# Patient Record
Sex: MALE | Race: WHITE | NOT HISPANIC OR LATINO | Employment: UNEMPLOYED | URBAN - METROPOLITAN AREA
[De-identification: names, ages, dates, MRNs, and addresses within clinical notes are randomized per-mention and may not be internally consistent; named-entity substitution may affect disease eponyms.]

---

## 2021-07-14 PROBLEM — K62.5 RECTAL BLEEDING: Status: ACTIVE | Noted: 2021-07-14

## 2021-12-10 ENCOUNTER — HOSPITAL ENCOUNTER (OUTPATIENT)
Dept: MEDSURG UNIT | Facility: HOSPITAL | Age: 51
End: 2021-12-11
Attending: STUDENT IN AN ORGANIZED HEALTH CARE EDUCATION/TRAINING PROGRAM | Admitting: PHYSICIAN ASSISTANT

## 2021-12-10 LAB
ABS NEUT (OLG): 4.66 X10(3)/MCL (ref 2.1–9.2)
ALBUMIN SERPL-MCNC: 3.4 GM/DL (ref 3.5–5)
ALBUMIN/GLOB SERPL: 0.9 RATIO (ref 1.1–2)
ALP SERPL-CCNC: 83 UNIT/L (ref 40–150)
ALT SERPL-CCNC: 21 UNIT/L (ref 0–55)
APPEARANCE, UA: CLEAR
AST SERPL-CCNC: 17 UNIT/L (ref 5–34)
BACTERIA #/AREA URNS AUTO: ABNORMAL /HPF
BASOPHILS # BLD AUTO: 0 X10(3)/MCL (ref 0–0.2)
BASOPHILS NFR BLD AUTO: 1 %
BILIRUB SERPL-MCNC: 0.3 MG/DL
BILIRUB UR QL STRIP: NEGATIVE
BILIRUBIN DIRECT+TOT PNL SERPL-MCNC: 0.1 MG/DL (ref 0–0.5)
BILIRUBIN DIRECT+TOT PNL SERPL-MCNC: 0.2 MG/DL (ref 0–0.8)
BNP BLD-MCNC: 12 PG/ML
BUN SERPL-MCNC: 12.3 MG/DL (ref 8.4–25.7)
CALCIUM SERPL-MCNC: 8.8 MG/DL (ref 8.7–10.5)
CHLORIDE SERPL-SCNC: 108 MMOL/L (ref 98–107)
CHOLEST SERPL-MCNC: 147 MG/DL
CHOLEST/HDLC SERPL: 7 {RATIO} (ref 0–5)
CK MB SERPL-MCNC: 0.8 NG/ML
CK SERPL-CCNC: 53 U/L (ref 30–200)
CO2 SERPL-SCNC: 25 MMOL/L (ref 22–29)
COLOR UR: YELLOW
CREAT SERPL-MCNC: 0.82 MG/DL (ref 0.73–1.18)
D DIMER PPP IA.FEU-MCNC: <0.27 MCG/ML FEU
EOSINOPHIL # BLD AUTO: 0.2 X10(3)/MCL (ref 0–0.9)
EOSINOPHIL NFR BLD AUTO: 2 %
ERYTHROCYTE [DISTWIDTH] IN BLOOD BY AUTOMATED COUNT: 13.3 % (ref 11.5–14.5)
EST. AVERAGE GLUCOSE BLD GHB EST-MCNC: 96.8 MG/DL
GLOBULIN SER-MCNC: 3.8 GM/DL (ref 2.4–3.5)
GLUCOSE (UA): NEGATIVE
GLUCOSE SERPL-MCNC: 87 MG/DL (ref 74–100)
HBA1C MFR BLD: 5 %
HCT VFR BLD AUTO: 40.5 % (ref 40–51)
HDLC SERPL-MCNC: 22 MG/DL (ref 35–60)
HGB BLD-MCNC: 12.9 GM/DL (ref 13.5–17.5)
HGB UR QL STRIP: NEGATIVE
HYALINE CASTS #/AREA URNS LPF: ABNORMAL /LPF
IMM GRANULOCYTES # BLD AUTO: 0.03 10*3/UL
IMM GRANULOCYTES NFR BLD AUTO: 0 %
KETONES UR QL STRIP: NEGATIVE
LDLC SERPL CALC-MCNC: 97 MG/DL (ref 50–140)
LEUKOCYTE ESTERASE UR QL STRIP: NEGATIVE
LYMPHOCYTES # BLD AUTO: 2.7 X10(3)/MCL (ref 0.6–4.6)
LYMPHOCYTES NFR BLD AUTO: 33 %
MCH RBC QN AUTO: 26.9 PG (ref 26–34)
MCHC RBC AUTO-ENTMCNC: 31.9 GM/DL (ref 31–37)
MCV RBC AUTO: 84.4 FL (ref 80–100)
MONOCYTES # BLD AUTO: 0.5 X10(3)/MCL (ref 0.1–1.3)
MONOCYTES NFR BLD AUTO: 6 %
NEUTROPHILS # BLD AUTO: 4.66 X10(3)/MCL (ref 2.1–9.2)
NEUTROPHILS NFR BLD AUTO: 58 %
NITRITE UR QL STRIP: NEGATIVE
NRBC BLD AUTO-RTO: 0 % (ref 0–0.2)
PH UR STRIP: 5.5 [PH] (ref 4.5–8)
PLATELET # BLD AUTO: 431 X10(3)/MCL (ref 130–400)
PMV BLD AUTO: 9.4 FL (ref 7.4–10.4)
POTASSIUM SERPL-SCNC: 3.8 MMOL/L (ref 3.5–5.1)
PROT SERPL-MCNC: 7.2 GM/DL (ref 6.4–8.3)
PROT UR QL STRIP: NEGATIVE
RBC # BLD AUTO: 4.8 X10(6)/MCL (ref 4.5–5.9)
RBC #/AREA URNS AUTO: ABNORMAL /HPF
SARS-COV-2 AG RESP QL IA.RAPID: NEGATIVE
SODIUM SERPL-SCNC: 139 MMOL/L (ref 136–145)
SP GR UR STRIP: 1.02 (ref 1–1.03)
SQUAMOUS #/AREA URNS LPF: ABNORMAL /LPF
TRIGL SERPL-MCNC: 139 MG/DL (ref 34–140)
TROPONIN I SERPL-MCNC: 0.11 NG/ML (ref 0–0.04)
TROPONIN I SERPL-MCNC: <0.01 NG/ML (ref 0–0.04)
TSH SERPL-ACNC: 1.83 UIU/ML (ref 0.35–4.94)
URATE SERPL-MCNC: 7.1 MG/DL (ref 3.5–7.2)
UROBILINOGEN UR STRIP-ACNC: NORMAL
VLDLC SERPL CALC-MCNC: 28 MG/DL
WBC # SPEC AUTO: 8.1 X10(3)/MCL (ref 4.5–11)
WBC #/AREA URNS AUTO: ABNORMAL /HPF

## 2021-12-11 LAB
ABS NEUT (OLG): 4.56 X10(3)/MCL (ref 2.1–9.2)
ALBUMIN SERPL-MCNC: 3 GM/DL (ref 3.5–5)
ALBUMIN/GLOB SERPL: 0.8 RATIO (ref 1.1–2)
ALP SERPL-CCNC: 75 UNIT/L (ref 40–150)
ALT SERPL-CCNC: 18 UNIT/L (ref 0–55)
AST SERPL-CCNC: 16 UNIT/L (ref 5–34)
BASOPHILS # BLD AUTO: 0 X10(3)/MCL (ref 0–0.2)
BASOPHILS NFR BLD AUTO: 1 %
BILIRUB SERPL-MCNC: 0.2 MG/DL
BILIRUBIN DIRECT+TOT PNL SERPL-MCNC: 0.1 MG/DL (ref 0–0.5)
BILIRUBIN DIRECT+TOT PNL SERPL-MCNC: 0.1 MG/DL (ref 0–0.8)
BUN SERPL-MCNC: 12.7 MG/DL (ref 8.4–25.7)
CALCIUM SERPL-MCNC: 8.5 MG/DL (ref 8.7–10.5)
CHLORIDE SERPL-SCNC: 108 MMOL/L (ref 98–107)
CO2 SERPL-SCNC: 26 MMOL/L (ref 22–29)
CREAT SERPL-MCNC: 0.79 MG/DL (ref 0.73–1.18)
EOSINOPHIL # BLD AUTO: 0.2 X10(3)/MCL (ref 0–0.9)
EOSINOPHIL NFR BLD AUTO: 2 %
ERYTHROCYTE [DISTWIDTH] IN BLOOD BY AUTOMATED COUNT: 13.3 % (ref 11.5–14.5)
GLOBULIN SER-MCNC: 3.6 GM/DL (ref 2.4–3.5)
GLUCOSE SERPL-MCNC: 96 MG/DL (ref 74–100)
HCT VFR BLD AUTO: 37.7 % (ref 40–51)
HGB BLD-MCNC: 12.2 GM/DL (ref 13.5–17.5)
IMM GRANULOCYTES # BLD AUTO: 0.02 10*3/UL
IMM GRANULOCYTES NFR BLD AUTO: 0 %
LYMPHOCYTES # BLD AUTO: 3.3 X10(3)/MCL (ref 0.6–4.6)
LYMPHOCYTES NFR BLD AUTO: 38 %
MAGNESIUM SERPL-MCNC: 1.9 MG/DL (ref 1.6–2.6)
MCH RBC QN AUTO: 27.1 PG (ref 26–34)
MCHC RBC AUTO-ENTMCNC: 32.4 GM/DL (ref 31–37)
MCV RBC AUTO: 83.8 FL (ref 80–100)
MONOCYTES # BLD AUTO: 0.6 X10(3)/MCL (ref 0.1–1.3)
MONOCYTES NFR BLD AUTO: 7 %
NEUTROPHILS # BLD AUTO: 4.56 X10(3)/MCL (ref 2.1–9.2)
NEUTROPHILS NFR BLD AUTO: 52 %
NRBC BLD AUTO-RTO: 0 % (ref 0–0.2)
PHOSPHATE SERPL-MCNC: 3.8 MG/DL (ref 2.3–4.7)
PLATELET # BLD AUTO: 399 X10(3)/MCL (ref 130–400)
PMV BLD AUTO: 9.6 FL (ref 7.4–10.4)
POTASSIUM SERPL-SCNC: 3.3 MMOL/L (ref 3.5–5.1)
PROT SERPL-MCNC: 6.6 GM/DL (ref 6.4–8.3)
RBC # BLD AUTO: 4.5 X10(6)/MCL (ref 4.5–5.9)
SODIUM SERPL-SCNC: 141 MMOL/L (ref 136–145)
TROPONIN I SERPL-MCNC: <0.01 NG/ML (ref 0–0.04)
TROPONIN I SERPL-MCNC: <0.01 NG/ML (ref 0–0.04)
WBC # SPEC AUTO: 8.8 X10(3)/MCL (ref 4.5–11)

## 2022-04-30 NOTE — ED PROVIDER NOTES
Patient:   Carlin Wills            MRN: [id number]            FIN: [id number]               Age:   51 years     Sex:  Male     :  1970   Associated Diagnoses:   Non-ST elevation MI (NSTEMI); Non-ST elevation MI (NSTEMI)   Author:   Veronica Jay PA-C      Basic Information   Time seen: Immediately upon arrival.   History source: Patient.   Arrival mode: Private vehicle.   History limitation: None.   Additional information: Chief Complaint from Nursing Triage Note : Chief Complaint   12/10/2021 13:33 CST     Chief Complaint           states pain to bilat leg pain states worse in am but never goes away has had pain x 1 month denies traums  .   Provider/Visit info:   Time Seen:  Veronica Jay PA-C / 12/10/2021 13:41  .   History of Present Illness   The patient presents with lower extremity pain.  The onset was 4  weeks ago.  The course/duration of symptoms is constant.  Type of injury: none.  Location: Bilateral leg. The character of symptoms is pain.  The degree at present is moderate.  There are exacerbating factors including weight bearing and walking.  The relieving factor is none.  Risk factors consist of none.  Prior episodes: occasional.  Therapy today: none.  Associated symptoms: none.      50 y/o  male with no known PMHx presents to the ED with complaints of bilateral lower extremity pain x 1 month. He is also complaining of L foot swelling and pain. He states he is concerned with his heart because he read that foot swelling can mean your heart isn't functioning properly. Denies chest pain, shortness of breath, dizziness, syncope, diaphoresis, palpitations. .        Review of Systems   Constitutional symptoms:  No fever, no chills.    Skin symptoms:  No rash,    ENMT symptoms:  No ear pain, no sore throat, no nasal congestion, no sinus pain.    Respiratory symptoms:  No shortness of breath, no cough, no wheezing.    Cardiovascular symptoms:  No chest pain,     Gastrointestinal symptoms:  No abdominal pain, no nausea, no vomiting, no diarrhea.    Musculoskeletal symptoms:  Joint pain, no back pain, no Muscle pain.    Neurologic symptoms:  No headache, no dizziness, no weakness.    Psychiatric symptoms:  No anxiety, no depression.              Additional review of systems information: All other systems reviewed and otherwise negative.      Health Status   Allergies:    Allergic Reactions (Selected)  No Known Allergies,    Allergies (1) Active Reaction  No Known Allergies None Documented  .   Medications:  (Selected)   Prescriptions  Prescribed  ibuprofen 800 mg oral tablet: 800 mg = 1 tab(s), Oral, TID, PRN PRN for pain, not to exceed 3200 mg/day  with food or milk, # 21 tab(s), 0 Refill(s)  methocarbamol 500 mg oral tablet: 1,000 mg = 2 tab(s), Oral, QID, PRN PRN as needed for pain, # 56 tab(s), 0 Refill(s)  Documented Medications  Documented  Percocet 5/325: 1 tab(s), Oral, q4hr, PRN PRN pain, moderate, 0 Refill(s)  Toradol 10 mg oral tablet: 10 mg = 1 tab(s), Oral, q8hr  Zanaflex 4 mg oral tablet: 4 mg = 1 tab(s), Oral, q8hr, PRN PRN spasm, 0 Refill(s)  prednisONE 20 mg oral tablet: 20 mg = 1 tab(s), Oral, Daily.      Past Medical/ Family/ Social History   Medical history:    No active or resolved past medical history items have been selected or recorded., Reviewed as documented in chart.   Surgical history:    Fusion Transforaminal Lumbar Interbody with O - Arm (.) on 2/1/2016 at 45 Years.  Comments:  2/1/2016 12:44 KIRAN - TAYLER Stanley , Soha  auto-populated from documented surgical case, Reviewed as documented in chart.   Family history:    No family history items have been selected or recorded., Reviewed as documented in chart.   Social history:    Social & Psychosocial Habits    Tobacco  02/01/2016  Use: Current every day smoker    Type: Cigarettes    Tobacco use per day: 10    Number of years: 30    Started at age: 16.0 Years    Previous treatment: None    Ready to  change: No    Concerns about tobacco use in household: No    07/23/2019  Use: 5-9 cigarettes (between 1    Type: Cigarettes    Patient Wants Consult For Cessation Counseling No    12/10/2021  Use: 5-9 cigarettes (between 1    Patient Wants Consult For Cessation Counseling No    Abuse/Neglect  07/23/2019  SHX Any signs of abuse or neglect No    12/10/2021  SHX Any signs of abuse or neglect No    Feels unsafe at home: No    Safe place to go: Yes  , Reviewed as documented in chart.   Problem list:    Active Problems (3)  At risk of pressure sore   Knowledge deficit   Tobacco user   .      Physical Examination               Vital Signs   Vital Signs   12/10/2021 13:33 CST     Temperature Oral          36.7 DegC                             Temperature Oral (calculated)             98.06 DegF                             Peripheral Pulse Rate     94 bpm                             Respiratory Rate          18 br/min                             SpO2                      98 %                             Oxygen Therapy            Room air                             Systolic Blood Pressure   147 mmHg  HI                             Diastolic Blood Pressure  87 mmHg  .      Vital Signs (last 24 hrs)_____  Last Charted___________  Temp Oral     36.7 DegC  (DEC 10 13:33)  Heart Rate Peripheral   94 bpm  (DEC 10 13:33)  Resp Rate         18 br/min  (DEC 10 13:33)  SBP      H 147mmHg  (DEC 10 13:33)  DBP      87 mmHg  (DEC 10 13:33)  SpO2      98 %  (DEC 10 13:33)  .   Measurements   12/10/2021 13:33 CST     Weight Dosing             104.8 kg                             Weight Measured and Calculated in Lbs     231.04 lb                             Weight Estimated          104.8 kg                             Height/Length Dosing      185 cm                             Height/Length Estimated   185 cm                             Body Mass Index Estimated 30.62 kg/m2  .   Basic Oxygen Information   12/10/2021 13:33 CST     SpO2                       98 %                             Oxygen Therapy            Room air  .   General:  Alert, no acute distress.    Skin:  Warm, intact, normal for ethnicity.    Head:  Normocephalic, atraumatic.    Neck:  Supple, trachea midline, no tenderness, no JVD.    Cardiovascular:  Regular rate and rhythm, No murmur, Normal peripheral perfusion No edema    Respiratory:  Lungs are clear to auscultation, respirations are non-labored, breath sounds are equal, Symmetrical chest wall expansion.    Gastrointestinal:  Soft, Nontender, Non distended, Normal bowel sounds.    Back:  Nontender, Normal range of motion, Normal alignment, no step-offs.    Musculoskeletal:  Normal ROM, normal strength, mild tenderness to bilateral lower extremities, moderate swelling of L foot and ankle, swelling of R ankle.    Neurological:  Alert and oriented to person, place, time, and situation, No focal neurological deficit observed.    Psychiatric:  Cooperative, appropriate mood & affect.       Medical Decision Making   Documents reviewed:  Emergency department nurses' notes.   Electrocardiogram:  Time 12/10/2021 15:58:00, rate 70, normal sinus rhythm, No ST-T changes, no ectopy, normal UT & QRS intervals.    Results review:  Lab results : Lab View   12/10/2021 17:45 CST     UA Appear                 Clear                             UA Color                  YELLOW                             UA Spec Grav              1.021                             UA Bili                   Negative                             UA pH                     5.5                             UA Urobilinogen           Normal                             UA Blood                  Negative                             UA Glucose                Negative                             UA Ketones                Negative                             UA Protein                Negative                             UA Nitrite                Negative                              UA Leuk Est               Negative                             UA WBC Interp             3-5 /HPF                             UA RBC Interp             0-2 /HPF                             UA Bact Interp            None Seen /HPF                             UA Squam Epi Interp       2-20 /LPF                             UA Hyal Cast Interp       0-2 /LPF    12/10/2021 16:38 CST     COVID-19 Rapid            NEGATIVE    12/10/2021 16:25 CST     Total CK                  53 U/L                             CK MB                     0.8 ng/mL    12/10/2021 15:13 CST     Est Creat Clearance Ser   118.53 mL/min    12/10/2021 14:19 CST     Sodium Lvl                139 mmol/L                             Potassium Lvl             3.8 mmol/L                             Chloride                  108 mmol/L  HI                             CO2                       25 mmol/L                             Calcium Lvl               8.8 mg/dL                             Glucose Lvl               87 mg/dL                             BUN                       12.3 mg/dL                             Creatinine                0.82 mg/dL                             eGFR-AA                   >105                             eGFR-JV                  105 mL/min/1.73 m2                             Bili Total                0.3 mg/dL                             Bili Direct               0.1 mg/dL                             Bili Indirect             0.20 mg/dL                             AST                       17 unit/L                             ALT                       21 unit/L                             Alk Phos                  83 unit/L                             Total Protein             7.2 gm/dL                             Albumin Lvl               3.4 gm/dL  LOW                             Globulin                  3.8 gm/dL  HI                             A/G Ratio                 0.9 ratio  LOW                              BNP                       12.0 pg/mL                             Troponin-I                0.114 ng/mL  HI                             WBC                       8.1 x10(3)/mcL                             RBC                       4.80 x10(6)/mcL                             Hgb                       12.9 gm/dL  LOW                             Hct                       40.5 %                             Platelet                  431 x10(3)/mcL  HI                             MCV                       84.4 fL                             MCH                       26.9 pg                             MCHC                      31.9 gm/dL                             RDW                       13.3 %                             MPV                       9.4 fL                             Abs Neut                  4.66 x10(3)/mcL                             Neutro Auto               58 %  NA                             Lymph Auto                33 %  NA                             Mono Auto                 6 %  NA                             Eos Auto                  2 %  NA                             Abs Eos                   0.2 x10(3)/mcL                             Basophil Auto             1 %  NA                             Abs Neutro                4.66 x10(3)/mcL                             Abs Lymph                 2.7 x10(3)/mcL                             Abs Mono                  0.5 x10(3)/mcL                             Abs Baso                  0.0 x10(3)/mcL                             NRBC%                     0.0 %                             IG%                       0 %  NA                             IG#                       0.030  NA                             D-Dimer                   <0.27 mcg/ml FEU    ,    Labs (Last four charted values)  WBC                  8.1 (DEC 10)   Hgb                  L 12.9 (DEC 10)   Hct                  40.5 (DEC 10)   Plt                  H 431 (DEC 10)   Na                    139 (DEC 10)   K                    3.8 (DEC 10)   CO2                  25 (DEC 10)   Cl                   H 108 (DEC 10)   Cr                   0.82 (DEC 10)   BUN                  12.3 (DEC 10)   Glucose Random       87 (DEC 10) .    Radiology results:  Rad Results (ST)  < 12 hrs   Accession: [id number]  Order: XR Chest 1 View  Report Dt/Tm: 12/10/2021 16:01  Report:   one view of the chest     CPT 24421     HISTORY:  Other (please specify)     FINDINGS:  Examination reveals mediastinal and cardiac silhouettes to be within  normal limits. Lung fields are clear and free of gross infiltrates  atelectases or effusions.     There is some increase in interstitial markings with no focal  consolidative changes     IMPRESSION: No active pulmonary disease.     Increased interstitial markings    .      Reexamination/ Reevaluation   Time: 12/10/2021 17:00:00 .   Vital signs   Basic Oxygen Information   12/10/2021 13:33 CST     SpO2                      98 %                             Oxygen Therapy            Room air     Course: well controlled.   Notes: Discussed lab results with patient. I will consult IM pending lab results for elevated troponin. Discussed case with Dr. Perez. .      Impression and Plan   Diagnosis   Non-ST elevation MI (NSTEMI) (SSW95-GI I21.4)    Diagnosis code search       Calls-Consults   -  12/10/2021 17:45:00 , Maurice Butcher DO, recommends Will come to ED for evaluation and admission. See note..    Plan   Condition: Stable.    Disposition: Admit time  12/10/2021 18:00:00, Admit to Inpatient Telemetry Unit.    Counseled: Patient, Regarding diagnosis, Regarding diagnostic results, Regarding treatment plan, Regarding prescription, Patient indicated understanding of instructions.       Addendum   51-year-old presents for leg swelling.  Essentially no primary follow-up.  Does not report any chest pain or pressure however work-up demonstrated a significant troponin elevation that requires  close monitoring and trending.  Exam was significant for bilateral ankle and feet swelling.  Appears grossly symmetric, no evidence of infection or DVT at this time  Veronica discussed this case upon admission and I agree with her assessment and plan as listed above.  My physical exam is in agreement with her's as listed above as well. patient ultimately requires admission at this time for further management.    -mau

## 2022-05-03 NOTE — HISTORICAL [facility]ERNER
This is a historical note converted from [employer]. Formatting and pictures may have been removed.  Please reference [employer] for original formatting and attached multimedia. Admit and Discharge Dates  Admit Date: 12/10/2021  Discharge Date: 12/11/2021  Physicians  Attending Physician - Mahendra BERNSTEIN, Pat  Primary Care Physician - Physician MD, Non Staff  Discharge Diagnosis  1. Elevated Troponin ?  ?   2.?Plantar fasciitis?M72.2  ?   3.?Leg pain-swelling?[id number]  ?   4.?Tobacco user?Z72.0  Admission Information  Patient is a 51-year-old male with no significant past medical history who presents to the emergency department for increased swelling and pain in his bilateral legs. ?He states the pain in his legs started approximately 1 month ago. ?It only occurs when he puts weight on them, it will happen the second he stands up and he feels like he is going to fall. ?Not related to exertion and not relieved by anything except laying back down. ?Patient states it goes from his thighs all the way down to his ankles. ?Over the last 4 days he has noticed an increased amount of swelling in his ankles and feet. ?Patient has not been able to work because he has to stay on his feet too long. ?He states this is a sharp pain and it is not radiating at all. ?There were no associated events when the pain started, no trauma. ?Legs are not erythematous, no sores or other wounds. ?Patient has good pulses and good color in feet. Patient has had no other associated symptoms as well. Troponin in the ED was found to be elevated at (0.112).??Patient with no current chest pain, he states he has never had significant chest pain. ?Patient has had no trouble breathing, no shortness of breath, no diaphoresis. ?Patient does not follow with primary care physician and takes no medications at home. ?He states he has a history of back surgery in 2016, and a colonoscopy after episodes of rectal bleeding while he was  incarcerated earlier this year. ?But no medical history beyond of these events. ?EKG was done which showed no ischemic changes.?Labs of note include chloride 108, albumin 3.4, troponin 0.112, hemoglobin 12.9, platelets 431. ?All other labs within normal limits. ?Urine was clean. ?D-dimer not elevated, patient is negative for Covid. ?Patient will be admitted to internal medicine service for NSTEMI/ACS rule out.  Hospital Course  While under care at [facility], Mr. Wills was managed for NSTEMI, Plantar Fasciitis, and Hypertension. Patient began experiencing lower extremity pain while bearing weight approximately 1 month ago. This persisted and x4 days prior to presentation, his ankles and feet became swollen bilaterally. There was no erythema/sores/or other wounds present. Patient denied recent trauma or extended periods walking. 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 Troponin was drawn in ED, elevated at (0.112) and no ECG changes. This was trended and resulted in reduction of troponin to normal values and insignificant ECG findings. U/S NIVA LE was performed: revealing no DVT. X-Rays of ankles/feet B/L revealed no findings of fractures, but did note soft tissue swelling. With concurrent tenderness to palpation, he was treated for plantar fasciitis and discharged home. He was provided education materials, Ibuprofen 600mg x10 tablets, and RICE recommendations. He is to follow up with [facility] and [facility] thereafter for assumption of care. At [facility], please schedule patient for Exercise Stress ECG.  Time Spent on discharge  > 30 minutes  Objective  Vitals & Measurements  T:?36.6? ?C (Oral)? TMIN:?36.4? ?C (Oral)? TMAX:?36.8? ?C (Oral)? HR:?67(Peripheral)? RR:?20? BP:?130/77? SpO2:?96%? WT:?104.8?kg? BMI:?30.62?  Physical Exam  Gen: well-nourished, well-developed?gentleman, in no acute distress  HEENT: Normocephalic, atraumatic. PERRL.  Neck: No thyromegaly. No cervical  tenderness/lymphadenopathy.  Heart: No anterior chest wall tenderness. RRR, no murmurs, gallops, clicks or rubs.  Lungs: CTAB without rales, wheezes or rhonchi. Normal work of breathing.  Abd: Soft, non-tender, non-distended and without guarding. Appropriate bowel sounds present.  Extremities: 2+ Radial and dorsalis?pedal pulses B/L. Mild trace edema left foot dorsum. No warmth/erythema to left lower extremity. Calf circumference similar b/t L. R. LE  MSK: No obvious deformities. Moves all extremities purposefully.  Neuro: Responds well to commands, answered all questions appropriately  Skin: Warm, dry, intact.  ?   Labs Last 24 Hours?  ?Chemistry? Hematology/Coagulation?   Sodium Lvl: 141 mmol/L (21 03:22:00) WBC: 8.8 x10(3)/mcL (21 03:22:00)   Potassium Lvl:?3.3 mmol/L?Low (21 03:22:00) RBC: 4.5 x10(6)/mcL (21 03:22:00)   Chloride:?108 mmol/L?High (21 03:22:00) Hgb:?12.2 gm/dL?Low (21 03:22:00)   CO2: 26 mmol/L (21 03:22:00) Hct:?37.7 %?Low (21 03:22:00)   Calcium Lvl:?8.5 mg/dL?Low (21 03:22:00) Platelet: 399 x10(3)/mcL (21 03:22:00)   Magnesium Lvl: 1.9 mg/dL (21 08:15:00) MCV: 83.8 fL (21 03:22:00)   Glucose Lvl: 96 mg/dL (21 03:22:00) MCH: 27.1 pg (21 03:22:00)   EA.8 mg/dL (12/10/21 18:57:00) MCHC: 32.4 gm/dL (21 03:22:00)   BUN: 12.7 mg/dL (21 03:22:00) RDW: 13.3 % (21 03:22:00)   Creatinine: 0.79 mg/dL (21 03:22:00) MPV: 9.6 fL (21 03:22:00)   Est Creat Clearance Ser: 123.03 mL/min (21 04:39:32) Abs Neut: 4.56 x10(3)/mcL (21 03:22:00)   eGFR-AA: >105 (21 03:22:00) Neutro Auto: 52 % (21 03:22:00)   eGFR-JV: >105 (21 03:22:00) Lymph Auto: 38 % (21 03:22:00)   Bili Total: 0.2 mg/dL (21 03:22:00) Mono Auto: 7 % (21 03:22:00)   Bili Direct: 0.1 mg/dL (21 03:22:00) Eos Auto: 2 % (21 03:22:00)   Bili Indirect: 0.1 mg/dL (21  03:22:00) Abs Eos: 0.2 x10(3)/mcL (21 03:22:00)   AST: 16 unit/L (21 03:22:00) Basophil Auto: 1 % (21 03:22:00)   ALT: 18 unit/L (21 03:22:00) Abs Neutro: 4.56 x10(3)/mcL (21 03:22:00)   Alk Phos: 75 unit/L (21 03:22:00) Abs Lymph: 3.3 x10(3)/mcL (21 03:22:00)   Total Protein: 6.6 gm/dL (21 03:22:00) Abs Mono: 0.6 x10(3)/mcL (21 03:22:00)   Albumin Lvl:?3 gm/dL?Low (21 03:22:00) Abs Baso: 0 x10(3)/mcL (21 03:22:00)   Globulin:?3.6 gm/dL?High (21 03:22:00) NRBC%: 0.0 (21 03:22:00)   A/G Ratio:?0.8 ratio?Low (21 03:22:00) IG%: 0 % (21 03:22:00)   Phosphorus: 3.8 mg/dL (21 08:15:00) IG#: 0.02 (21 03:22:00)   Uric Acid: 7.1 mg/dL (12/10/21 21:32:00) ?   Hgb A1c: 5 % (12/10/21 18:57:00) ?   Chol: 147 mg/dL (12/10/21 18:57:00) ?   HDL:?22 mg/dL?Low (12/10/21 18:57:00) ?   Tri mg/dL (12/10/21 18:57:00) ?   LDL: 97 mg/dL (12/10/21 18:57:00) ?   Chol/HDL:?7?High (12/10/21 18:57:00) ?   VLDL: 28 (12/10/21 18:57:00) ?   Troponin-I: <0.010 (21 10:15:00) ?   TSH: 1.8252 uIU/mL (12/10/21 18:57:00) ?   ?  Patient Discharge Condition  Patient was clinically, medically, and hemodynamically stable at time of discharge.  Discharge Disposition  Mr. Carlin Wills is being discharged home.  ?   Activity: Return to normal daily activity as tolerated.?Recommend returning to work in 2 days.  Diet: Regular Diet  ?   Medications:  -Rx provided for Ibuprofen 600 mg, x10 tablets  -Continue other medications as previously prescribed  ?   -Patient to ease back to work schedule.  -Offered educations supplies regarding plantar fasciitis  -Recommended RICE therapy in addition to anti-inflammatory agents?at home for continued healing/therapy of left lower extremity  ?   Follow Ups:  -To be seen in [facility] as scheduled  -Exercise ECG?stress test to be ordered at [facility] follow up.  ?  -Referred to [facility], for  continuation of care and management under a?PCP  ?  ?  The above information was discussed with?Mr. Wills?in clear terms. He was able to repeat the instructions to me in?his own words. All questions answered. ED precautions provided.  ?  ?  ?  ?  ?  ?  ?  ?  ?  ?  ?  Staff Addendum:  I was present with the resident during the history and exam.  ???  [x] I discussed the case with the resident and agree with the findings and plan as documented in the residents note.  [ ] I discussed the case with the resident and agree with the findings and plan as documented in the residents note except:  ?  Clinically improved this morning. XR negative for fracture and DVT US negative as well. Do not feel that he has an infectious process such as myositis or cellulitis at this time, wbc is wnl with no systemic symptoms. Clinically not appearing as infection. Treating for plantar fasciitis and recommend appropriate orthotics. Will need follow up in [facility] visit - can establish care with PCP at that time, would also order outpatient treadmill stress test given that he did present with an initial elevated troponin.  ?   Discharge Medication Reconciliation  Prescribed  ibuprofen (ibuprofen 600 mg oral tablet)?600 mg, Oral, QID, PRN for pain  Discontinue  acetaminophen-oxyCODONE (Percocet 5/325)?1 tab(s), Oral, q4hr, PRN pain, moderate  ibuprofen (ibuprofen 800 mg oral tablet)?800 mg, Oral, TID, PRN for pain  ketorolac (Toradol 10 mg oral tablet)?10 mg, Oral, q8hr  methocarbamol (methocarbamol 500 mg oral tablet)?1,000 mg, Oral, QID, PRN as needed for pain  predniSONE (prednisONE 20 mg oral tablet)?20 mg, Oral, Daily  tiZANidine (Zanaflex 4 mg oral tablet)?4 mg, Oral, q8hr, PRN spasm  Education and Orders Provided  Smoking Cessation, Tips for Success  Plantar Fasciitis  Plantar Fasciitis Rehab-[facility]  Discharge - 12/11/21 15:21:00 CST, Home?  Follow up  Keep scheduled appointment  ????Office will call w/follow up appointment,  IM  [employer] [facility]  No Qualifying Data